# Patient Record
Sex: MALE | Race: WHITE | URBAN - NONMETROPOLITAN AREA
[De-identification: names, ages, dates, MRNs, and addresses within clinical notes are randomized per-mention and may not be internally consistent; named-entity substitution may affect disease eponyms.]

---

## 2017-03-08 ENCOUNTER — DOCTOR'S OFFICE (OUTPATIENT)
Dept: URBAN - NONMETROPOLITAN AREA CLINIC 1 | Facility: CLINIC | Age: 27
Setting detail: OPHTHALMOLOGY
End: 2017-03-08
Payer: COMMERCIAL

## 2017-03-08 DIAGNOSIS — H01.002: ICD-10-CM

## 2017-03-08 DIAGNOSIS — H01.005: ICD-10-CM

## 2017-03-08 DIAGNOSIS — H00.11: ICD-10-CM

## 2017-03-08 DIAGNOSIS — H01.004: ICD-10-CM

## 2017-03-08 DIAGNOSIS — H01.001: ICD-10-CM

## 2017-03-08 DIAGNOSIS — H00.14: ICD-10-CM

## 2017-03-08 PROCEDURE — 92012 INTRM OPH EXAM EST PATIENT: CPT | Performed by: OPHTHALMOLOGY

## 2017-03-08 ASSESSMENT — REFRACTION_CURRENTRX
OD_OVR_VA: 20/
OS_OVR_VA: 20/
OD_OVR_VA: 20/
OS_OVR_VA: 20/
OD_OVR_VA: 20/
OS_OVR_VA: 20/

## 2017-03-08 ASSESSMENT — VISUAL ACUITY
OS_BCVA: 20/80
OD_BCVA: 20/40

## 2017-03-08 ASSESSMENT — REFRACTION_MANIFEST
OD_VA1: 20/
OD_VA3: 20/
OS_VA1: 20/
OS_VA1: 20/
OS_VA2: 20/
OD_VA2: 20/
OD_VA3: 20/
OS_VA1: 20/
OU_VA: 20/
OD_VA1: 20/
OU_VA: 20/
OD_VA2: 20/
OS_VA3: 20/
OU_VA: 20/
OS_VA3: 20/
OS_VA3: 20/
OS_VA2: 20/
OS_VA2: 20/
OD_VA3: 20/
OD_VA2: 20/
OD_VA1: 20/

## 2017-03-08 ASSESSMENT — LID EXAM ASSESSMENTS
OS_BLEPHARITIS: 3+
OD_BLEPHARITIS: 3+

## 2017-03-27 ENCOUNTER — DOCTOR'S OFFICE (OUTPATIENT)
Dept: URBAN - NONMETROPOLITAN AREA CLINIC 1 | Facility: CLINIC | Age: 27
Setting detail: OPHTHALMOLOGY
End: 2017-03-27
Payer: COMMERCIAL

## 2017-03-27 ENCOUNTER — RX ONLY (RX ONLY)
Age: 27
End: 2017-03-27

## 2017-03-27 DIAGNOSIS — H00.11: ICD-10-CM

## 2017-03-27 DIAGNOSIS — H01.004: ICD-10-CM

## 2017-03-27 DIAGNOSIS — H00.14: ICD-10-CM

## 2017-03-27 DIAGNOSIS — H01.005: ICD-10-CM

## 2017-03-27 DIAGNOSIS — H01.002: ICD-10-CM

## 2017-03-27 DIAGNOSIS — H01.001: ICD-10-CM

## 2017-03-27 PROCEDURE — 92012 INTRM OPH EXAM EST PATIENT: CPT | Performed by: OPHTHALMOLOGY

## 2017-03-27 ASSESSMENT — REFRACTION_MANIFEST
OD_VA2: 20/
OS_VA3: 20/
OS_VA2: 20/
OD_VA2: 20/
OD_VA2: 20/
OS_VA2: 20/
OU_VA: 20/
OD_VA1: 20/
OS_VA1: 20/
OU_VA: 20/
OD_VA3: 20/
OD_VA3: 20/
OS_VA1: 20/
OD_VA1: 20/
OS_VA3: 20/
OD_VA3: 20/
OS_VA3: 20/
OS_VA1: 20/
OU_VA: 20/
OS_VA2: 20/
OD_VA1: 20/

## 2017-03-27 ASSESSMENT — REFRACTION_CURRENTRX
OD_OVR_VA: 20/
OS_OVR_VA: 20/
OD_OVR_VA: 20/
OD_OVR_VA: 20/
OS_OVR_VA: 20/
OS_OVR_VA: 20/

## 2017-03-27 ASSESSMENT — LID EXAM ASSESSMENTS
OS_BLEPHARITIS: 3+
OD_BLEPHARITIS: 3+

## 2017-03-27 ASSESSMENT — VISUAL ACUITY
OS_BCVA: 20/25
OD_BCVA: 20/25

## 2017-04-03 ENCOUNTER — DOCTOR'S OFFICE (OUTPATIENT)
Dept: URBAN - NONMETROPOLITAN AREA CLINIC 1 | Facility: CLINIC | Age: 27
Setting detail: OPHTHALMOLOGY
End: 2017-04-03
Payer: COMMERCIAL

## 2017-04-03 DIAGNOSIS — H00.14: ICD-10-CM

## 2017-04-03 DIAGNOSIS — H01.004: ICD-10-CM

## 2017-04-03 DIAGNOSIS — H01.001: ICD-10-CM

## 2017-04-03 DIAGNOSIS — H01.002: ICD-10-CM

## 2017-04-03 DIAGNOSIS — H00.11: ICD-10-CM

## 2017-04-03 DIAGNOSIS — H01.005: ICD-10-CM

## 2017-04-03 PROCEDURE — 92012 INTRM OPH EXAM EST PATIENT: CPT | Performed by: OPHTHALMOLOGY

## 2017-04-03 ASSESSMENT — LID EXAM ASSESSMENTS
OD_BLEPHARITIS: 1+
OS_BLEPHARITIS: 1+
OS_MEIBOMITIS: 1+
OD_MEIBOMITIS: 1+

## 2017-04-25 ASSESSMENT — REFRACTION_MANIFEST
OS_VA2: 20/
OS_VA1: 20/
OS_VA1: 20/
OS_VA2: 20/
OS_VA1: 20/
OD_VA2: 20/
OD_VA3: 20/
OD_VA2: 20/
OS_VA3: 20/
OU_VA: 20/
OS_VA2: 20/
OD_VA1: 20/
OU_VA: 20/
OD_VA1: 20/
OD_VA3: 20/
OD_VA1: 20/
OS_VA3: 20/
OD_VA2: 20/
OS_VA3: 20/
OD_VA3: 20/
OU_VA: 20/

## 2017-04-25 ASSESSMENT — REFRACTION_CURRENTRX
OD_OVR_VA: 20/
OS_OVR_VA: 20/
OD_OVR_VA: 20/
OS_OVR_VA: 20/
OD_OVR_VA: 20/
OS_OVR_VA: 20/

## 2017-04-25 ASSESSMENT — VISUAL ACUITY
OD_BCVA: 2040
OS_BCVA: 20/50

## 2017-07-14 ENCOUNTER — DOCTOR'S OFFICE (OUTPATIENT)
Dept: URBAN - NONMETROPOLITAN AREA CLINIC 1 | Facility: CLINIC | Age: 27
Setting detail: OPHTHALMOLOGY
End: 2017-07-14
Payer: COMMERCIAL

## 2017-07-14 DIAGNOSIS — H01.002: ICD-10-CM

## 2017-07-14 DIAGNOSIS — H00.14: ICD-10-CM

## 2017-07-14 DIAGNOSIS — H10.12: ICD-10-CM

## 2017-07-14 DIAGNOSIS — H10.11: ICD-10-CM

## 2017-07-14 DIAGNOSIS — H01.004: ICD-10-CM

## 2017-07-14 DIAGNOSIS — H00.11: ICD-10-CM

## 2017-07-14 DIAGNOSIS — H01.005: ICD-10-CM

## 2017-07-14 DIAGNOSIS — H01.001: ICD-10-CM

## 2017-07-14 PROCEDURE — 92012 INTRM OPH EXAM EST PATIENT: CPT | Performed by: OPHTHALMOLOGY

## 2017-07-14 PROCEDURE — 92285 EXTERNAL OCULAR PHOTOGRAPHY: CPT | Performed by: OPHTHALMOLOGY

## 2017-07-14 ASSESSMENT — REFRACTION_MANIFEST
OD_VA3: 20/
OS_VA1: 20/
OD_VA3: 20/
OS_VA3: 20/
OD_VA1: 20/
OU_VA: 20/
OD_VA3: 20/
OD_VA1: 20/
OS_VA3: 20/
OS_VA2: 20/
OS_VA1: 20/
OS_VA1: 20/
OD_VA2: 20/
OS_VA3: 20/
OD_VA1: 20/
OU_VA: 20/
OU_VA: 20/
OD_VA2: 20/
OD_VA2: 20/

## 2017-07-14 ASSESSMENT — REFRACTION_CURRENTRX
OD_AXIS: 180
OS_OVR_VA: 20/
OS_SPHERE: -0.75
OS_OVR_VA: 20/
OS_AXIS: 15
OD_OVR_VA: 20/
OD_CYLINDER: 0.00
OD_SPHERE: -1.25
OD_OVR_VA: 20/
OS_OVR_VA: 20/
OD_OVR_VA: 20/
OS_CYLINDER: -0.50

## 2017-07-14 ASSESSMENT — LID EXAM ASSESSMENTS
OD_MEIBOMITIS: 1+
OS_MEIBOMITIS: 1+
OS_BLEPHARITIS: 1+
OD_BLEPHARITIS: 1+

## 2017-07-14 ASSESSMENT — VISUAL ACUITY
OD_BCVA: 20/25+2
OS_BCVA: 20/25+2

## 2017-07-14 ASSESSMENT — SUPERFICIAL PUNCTATE KERATITIS (SPK)
OD_SPK: ABSENT
OS_SPK: ABSENT

## 2017-08-08 ENCOUNTER — AMBUL SURGICAL CARE (OUTPATIENT)
Dept: URBAN - NONMETROPOLITAN AREA SURGERY 1 | Facility: SURGERY | Age: 27
Setting detail: OPHTHALMOLOGY
End: 2017-08-08
Payer: COMMERCIAL

## 2017-08-08 DIAGNOSIS — D23.11: ICD-10-CM

## 2017-08-08 DIAGNOSIS — D23.12: ICD-10-CM

## 2017-08-08 DIAGNOSIS — D23.10: ICD-10-CM

## 2017-08-08 PROCEDURE — 67840 REMOVE EYELID LESION: CPT | Performed by: OPHTHALMOLOGY

## 2017-08-08 PROCEDURE — 67700 BLEPHAROTOMY DRG ABSC EYELID: CPT | Performed by: OPHTHALMOLOGY

## 2017-08-08 PROCEDURE — G8918 PT W/O PREOP ORDER IV AB PRO: HCPCS | Performed by: OPHTHALMOLOGY

## 2017-08-08 PROCEDURE — G8907 PT DOC NO EVENTS ON DISCHARG: HCPCS | Performed by: OPHTHALMOLOGY

## 2017-08-14 ENCOUNTER — RX ONLY (RX ONLY)
Age: 27
End: 2017-08-14

## 2017-08-14 ENCOUNTER — DOCTOR'S OFFICE (OUTPATIENT)
Dept: URBAN - NONMETROPOLITAN AREA CLINIC 1 | Facility: CLINIC | Age: 27
Setting detail: OPHTHALMOLOGY
End: 2017-08-14
Payer: COMMERCIAL

## 2017-08-14 DIAGNOSIS — H01.005: ICD-10-CM

## 2017-08-14 DIAGNOSIS — H01.002: ICD-10-CM

## 2017-08-14 DIAGNOSIS — H01.004: ICD-10-CM

## 2017-08-14 DIAGNOSIS — H01.001: ICD-10-CM

## 2017-08-14 DIAGNOSIS — H10.12: ICD-10-CM

## 2017-08-14 DIAGNOSIS — H10.11: ICD-10-CM

## 2017-08-14 DIAGNOSIS — H00.14: ICD-10-CM

## 2017-08-14 DIAGNOSIS — H00.11: ICD-10-CM

## 2017-08-14 PROCEDURE — 99024 POSTOP FOLLOW-UP VISIT: CPT | Performed by: OPHTHALMOLOGY

## 2017-08-14 ASSESSMENT — REFRACTION_MANIFEST
OD_VA1: 20/
OS_VA3: 20/
OD_VA3: 20/
OS_VA3: 20/
OS_VA2: 20/
OD_VA1: 20/
OS_VA1: 20/
OU_VA: 20/
OS_VA1: 20/
OD_VA1: 20/
OS_VA1: 20/
OU_VA: 20/
OD_VA2: 20/
OD_VA3: 20/
OD_VA3: 20/
OD_VA2: 20/
OD_VA2: 20/
OU_VA: 20/
OS_VA2: 20/
OS_VA3: 20/
OS_VA2: 20/

## 2017-08-14 ASSESSMENT — REFRACTION_CURRENTRX
OD_OVR_VA: 20/
OD_CYLINDER: 0.00
OS_AXIS: 15
OD_SPHERE: -1.25
OD_OVR_VA: 20/
OD_AXIS: 180
OS_OVR_VA: 20/
OS_OVR_VA: 20/
OD_OVR_VA: 20/
OS_OVR_VA: 20/
OS_SPHERE: -0.75
OS_CYLINDER: -0.50

## 2017-08-14 ASSESSMENT — VISUAL ACUITY
OS_BCVA: 20/60-2
OD_BCVA: 20/30-1

## 2017-08-14 ASSESSMENT — LID EXAM ASSESSMENTS
OS_EDEMA: 1+
OS_BLEPHARITIS: 1+
OD_MEIBOMITIS: 1+
OD_EDEMA: 1+
OD_BLEPHARITIS: 1+
OS_MEIBOMITIS: 1+

## 2017-08-14 ASSESSMENT — SUPERFICIAL PUNCTATE KERATITIS (SPK)
OD_SPK: ABSENT
OS_SPK: ABSENT

## 2017-08-21 ENCOUNTER — DOCTOR'S OFFICE (OUTPATIENT)
Dept: URBAN - NONMETROPOLITAN AREA CLINIC 1 | Facility: CLINIC | Age: 27
Setting detail: OPHTHALMOLOGY
End: 2017-08-21
Payer: COMMERCIAL

## 2017-08-21 DIAGNOSIS — H01.005: ICD-10-CM

## 2017-08-21 DIAGNOSIS — H01.004: ICD-10-CM

## 2017-08-21 DIAGNOSIS — H01.002: ICD-10-CM

## 2017-08-21 DIAGNOSIS — H00.11: ICD-10-CM

## 2017-08-21 DIAGNOSIS — H01.001: ICD-10-CM

## 2017-08-21 DIAGNOSIS — H00.14: ICD-10-CM

## 2017-08-21 PROCEDURE — 92012 INTRM OPH EXAM EST PATIENT: CPT | Performed by: OPHTHALMOLOGY

## 2017-08-21 ASSESSMENT — REFRACTION_MANIFEST
OS_VA2: 20/
OD_VA2: 20/
OS_VA1: 20/
OU_VA: 20/
OS_VA1: 20/
OU_VA: 20/
OS_VA2: 20/
OD_VA3: 20/
OD_VA1: 20/
OS_VA1: 20/
OD_VA2: 20/
OD_VA3: 20/
OS_VA3: 20/
OD_VA3: 20/
OD_VA1: 20/
OS_VA2: 20/
OU_VA: 20/
OD_VA2: 20/
OS_VA3: 20/
OD_VA1: 20/
OS_VA3: 20/

## 2017-08-21 ASSESSMENT — REFRACTION_CURRENTRX
OD_AXIS: 180
OD_CYLINDER: 0.00
OD_SPHERE: -1.25
OS_OVR_VA: 20/
OD_OVR_VA: 20/
OS_AXIS: 15
OS_CYLINDER: -0.50
OD_OVR_VA: 20/
OD_OVR_VA: 20/
OS_OVR_VA: 20/
OS_SPHERE: -0.75
OS_OVR_VA: 20/

## 2017-08-21 ASSESSMENT — VISUAL ACUITY
OD_BCVA: 20/20
OS_BCVA: 20/25

## 2017-08-21 ASSESSMENT — CONFRONTATIONAL VISUAL FIELD TEST (CVF)
OS_FINDINGS: FULL
OD_FINDINGS: FULL

## 2017-08-21 ASSESSMENT — LID EXAM ASSESSMENTS
OD_TRICHIASIS: ABSENT
OD_MEIBOMITIS: 1+
OS_MEIBOMITIS: 1+
OS_BLEPHARITIS: 1+
OD_BLEPHARITIS: 1+

## 2017-08-21 ASSESSMENT — SUPERFICIAL PUNCTATE KERATITIS (SPK)
OS_SPK: ABSENT
OD_SPK: ABSENT

## 2017-09-11 ENCOUNTER — DOCTOR'S OFFICE (OUTPATIENT)
Dept: URBAN - NONMETROPOLITAN AREA CLINIC 1 | Facility: CLINIC | Age: 27
Setting detail: OPHTHALMOLOGY
End: 2017-09-11
Payer: COMMERCIAL

## 2017-09-11 DIAGNOSIS — H01.005: ICD-10-CM

## 2017-09-11 DIAGNOSIS — H00.11: ICD-10-CM

## 2017-09-11 DIAGNOSIS — H01.004: ICD-10-CM

## 2017-09-11 DIAGNOSIS — H01.001: ICD-10-CM

## 2017-09-11 DIAGNOSIS — H00.14: ICD-10-CM

## 2017-09-11 DIAGNOSIS — H01.002: ICD-10-CM

## 2017-09-11 PROBLEM — H10.12 ALLERGIC CONJUNCTIVITS; RIGHT EYE, LEFT EYE: Status: ACTIVE | Noted: 2017-07-14

## 2017-09-11 PROBLEM — H10.11 ALLERGIC CONJUNCTIVITS; RIGHT EYE, LEFT EYE: Status: ACTIVE | Noted: 2017-07-14

## 2017-09-11 PROCEDURE — 99214 OFFICE O/P EST MOD 30 MIN: CPT | Performed by: OPHTHALMOLOGY

## 2017-09-11 ASSESSMENT — REFRACTION_MANIFEST
OD_VA1: 20/
OD_VA3: 20/
OD_VA2: 20/
OU_VA: 20/
OD_VA2: 20/
OS_VA3: 20/
OD_VA2: 20/
OD_VA1: 20/
OS_VA1: 20/
OD_VA1: 20/
OS_VA1: 20/
OS_VA1: 20/
OS_VA3: 20/
OD_VA3: 20/
OD_VA3: 20/
OS_VA2: 20/
OS_VA3: 20/
OS_VA2: 20/
OS_VA2: 20/

## 2017-09-11 ASSESSMENT — REFRACTION_CURRENTRX
OS_OVR_VA: 20/
OS_OVR_VA: 20/
OD_SPHERE: -1.25
OD_CYLINDER: 0.00
OD_OVR_VA: 20/
OS_SPHERE: -0.75
OD_OVR_VA: 20/
OS_CYLINDER: -0.50
OD_OVR_VA: 20/
OD_AXIS: 180
OS_AXIS: 15
OS_OVR_VA: 20/

## 2017-09-11 ASSESSMENT — SUPERFICIAL PUNCTATE KERATITIS (SPK)
OS_SPK: ABSENT
OD_SPK: ABSENT

## 2017-09-11 ASSESSMENT — LID EXAM ASSESSMENTS
OD_BLEPHARITIS: 1+
OD_MEIBOMITIS: 1+
OS_BLEPHARITIS: 1+
OS_MEIBOMITIS: 1+
OD_TRICHIASIS: ABSENT

## 2017-09-11 ASSESSMENT — CONFRONTATIONAL VISUAL FIELD TEST (CVF)
OS_FINDINGS: FULL
OD_FINDINGS: FULL

## 2017-09-11 ASSESSMENT — VISUAL ACUITY
OS_BCVA: 20/25-2
OD_BCVA: 20/25-1

## 2020-06-17 ENCOUNTER — APPOINTMENT (OUTPATIENT)
Dept: LAB | Facility: HOSPITAL | Age: 30
End: 2020-06-17
Attending: INTERNAL MEDICINE
Payer: COMMERCIAL

## 2020-06-17 ENCOUNTER — TRANSCRIBE ORDERS (OUTPATIENT)
Dept: ADMINISTRATIVE | Facility: HOSPITAL | Age: 30
End: 2020-06-17

## 2020-06-17 DIAGNOSIS — R74.8 ABNORMAL LIVER ENZYMES: ICD-10-CM

## 2020-06-17 DIAGNOSIS — K76.0 FATTY LIVER: Primary | ICD-10-CM

## 2020-06-17 DIAGNOSIS — K76.0 FATTY LIVER: ICD-10-CM

## 2020-06-17 LAB
ALBUMIN SERPL BCP-MCNC: 4.4 G/DL (ref 3.5–5)
ALP SERPL-CCNC: 157 U/L (ref 46–116)
ALT SERPL W P-5'-P-CCNC: 106 U/L (ref 12–78)
ANION GAP SERPL CALCULATED.3IONS-SCNC: 11 MMOL/L (ref 4–13)
AST SERPL W P-5'-P-CCNC: 41 U/L (ref 5–45)
BASOPHILS # BLD AUTO: 0.04 THOUSANDS/ΜL (ref 0–0.1)
BASOPHILS NFR BLD AUTO: 1 % (ref 0–1)
BILIRUB DIRECT SERPL-MCNC: 0.18 MG/DL (ref 0–0.2)
BILIRUB SERPL-MCNC: 0.7 MG/DL (ref 0.2–1)
BUN SERPL-MCNC: 13 MG/DL (ref 5–25)
CALCIUM SERPL-MCNC: 9.1 MG/DL (ref 8.3–10.1)
CHLORIDE SERPL-SCNC: 103 MMOL/L (ref 100–108)
CO2 SERPL-SCNC: 26 MMOL/L (ref 21–32)
CREAT SERPL-MCNC: 0.84 MG/DL (ref 0.6–1.3)
EOSINOPHIL # BLD AUTO: 0.14 THOUSAND/ΜL (ref 0–0.61)
EOSINOPHIL NFR BLD AUTO: 3 % (ref 0–6)
ERYTHROCYTE [DISTWIDTH] IN BLOOD BY AUTOMATED COUNT: 12.1 % (ref 11.6–15.1)
GFR SERPL CREATININE-BSD FRML MDRD: 118 ML/MIN/1.73SQ M
GLUCOSE SERPL-MCNC: 93 MG/DL (ref 65–140)
HCT VFR BLD AUTO: 42.1 % (ref 36.5–49.3)
HGB BLD-MCNC: 14.9 G/DL (ref 12–17)
IMM GRANULOCYTES # BLD AUTO: 0.02 THOUSAND/UL (ref 0–0.2)
IMM GRANULOCYTES NFR BLD AUTO: 0 % (ref 0–2)
LYMPHOCYTES # BLD AUTO: 1.08 THOUSANDS/ΜL (ref 0.6–4.47)
LYMPHOCYTES NFR BLD AUTO: 19 % (ref 14–44)
MCH RBC QN AUTO: 31.2 PG (ref 26.8–34.3)
MCHC RBC AUTO-ENTMCNC: 35.4 G/DL (ref 31.4–37.4)
MCV RBC AUTO: 88 FL (ref 82–98)
MONOCYTES # BLD AUTO: 0.48 THOUSAND/ΜL (ref 0.17–1.22)
MONOCYTES NFR BLD AUTO: 9 % (ref 4–12)
NEUTROPHILS # BLD AUTO: 3.83 THOUSANDS/ΜL (ref 1.85–7.62)
NEUTS SEG NFR BLD AUTO: 68 % (ref 43–75)
NRBC BLD AUTO-RTO: 0 /100 WBCS
PLATELET # BLD AUTO: 198 THOUSANDS/UL (ref 149–390)
PMV BLD AUTO: 12.6 FL (ref 8.9–12.7)
POTASSIUM SERPL-SCNC: 4.3 MMOL/L (ref 3.5–5.3)
PROT SERPL-MCNC: 7.6 G/DL (ref 6.4–8.2)
RBC # BLD AUTO: 4.77 MILLION/UL (ref 3.88–5.62)
SODIUM SERPL-SCNC: 140 MMOL/L (ref 136–145)
WBC # BLD AUTO: 5.59 THOUSAND/UL (ref 4.31–10.16)

## 2020-06-17 PROCEDURE — 85025 COMPLETE CBC W/AUTO DIFF WBC: CPT

## 2020-06-17 PROCEDURE — 82248 BILIRUBIN DIRECT: CPT

## 2020-06-17 PROCEDURE — 36415 COLL VENOUS BLD VENIPUNCTURE: CPT

## 2020-06-17 PROCEDURE — 80053 COMPREHEN METABOLIC PANEL: CPT

## 2020-06-17 PROCEDURE — 86038 ANTINUCLEAR ANTIBODIES: CPT

## 2020-06-19 LAB — RYE IGE QN: NEGATIVE

## 2024-05-14 ENCOUNTER — OFFICE VISIT (OUTPATIENT)
Dept: URGENT CARE | Facility: CLINIC | Age: 34
End: 2024-05-14
Payer: COMMERCIAL

## 2024-05-14 VITALS
WEIGHT: 170 LBS | RESPIRATION RATE: 18 BRPM | OXYGEN SATURATION: 97 % | DIASTOLIC BLOOD PRESSURE: 68 MMHG | HEIGHT: 65 IN | BODY MASS INDEX: 28.32 KG/M2 | TEMPERATURE: 96.3 F | HEART RATE: 90 BPM | SYSTOLIC BLOOD PRESSURE: 112 MMHG

## 2024-05-14 DIAGNOSIS — M53.3 SI (SACROILIAC) JOINT DYSFUNCTION: Primary | ICD-10-CM

## 2024-05-14 PROCEDURE — S9083 URGENT CARE CENTER GLOBAL: HCPCS

## 2024-05-14 PROCEDURE — G0382 LEV 3 HOSP TYPE B ED VISIT: HCPCS

## 2024-05-14 RX ORDER — ALBUTEROL SULFATE 90 UG/1
2 AEROSOL, METERED RESPIRATORY (INHALATION) 2 TIMES DAILY PRN
COMMUNITY
Start: 2024-02-02

## 2024-05-14 RX ORDER — OMEPRAZOLE/SODIUM BICARBONATE 40; 1680 MG/1; MG/1
40 POWDER, FOR SUSPENSION ORAL DAILY
COMMUNITY

## 2024-05-14 RX ORDER — LEVETIRACETAM 500 MG/1
500 TABLET, FILM COATED ORAL 2 TIMES DAILY
COMMUNITY
Start: 2024-04-29

## 2024-05-14 RX ORDER — METHYLPREDNISOLONE 4 MG/1
TABLET ORAL
Qty: 21 EACH | Refills: 0 | Status: SHIPPED | OUTPATIENT
Start: 2024-05-14

## 2024-05-14 NOTE — PATIENT INSTRUCTIONS
Take oral steroids as prescribed  OTC Tylenol as needed for pain  Heat/Ice  Topical analgesics  Referral placed to PT   Follow up with PCP in 3-5 days.  Proceed to  ER if symptoms worsen.    If tests have been performed at [facility], our office will contact you with results if changes need to be made to the care plan discussed with you at the visit.  You can review your full results on [facility] MyChart.    Back Pain   AMBULATORY CARE:   Back pain  is common. You may have back pain and muscle spasms. You may feel sore or stiff on one or both sides of your back. The pain may spread to your lower body. Conditions that affect the spine, joints, or muscles can cause back pain. These may include arthritis, spinal stenosis (narrowing of the spinal column), muscle tension, or breakdown of the spinal discs.  Call your local emergency number (911 in the [place]) if:   You have severe back pain with chest pain.    You cannot control your urine or bowel movements.    Your pain becomes so severe that you cannot walk.    Seek care immediately if:   You have pain, numbness, or weakness in one or both legs.    You have severe back pain, nausea, and vomiting.    You have severe back pain that spreads to your side or genital area.    Call your doctor if:   You have back pain that does not get better with rest and pain medicine.    You have a fever.    You have pain that worsens when you are on your back or when you rest.    You have pain that worsens when you cough or sneeze.    You lose weight without trying.    You have questions or concerns about your condition or care.    Medicines:  You may need any of the following:  NSAIDs  help decrease swelling and pain or fever. This medicine is available with or without a doctor's order. NSAIDs can cause stomach bleeding or kidney problems in certain people. If you take blood thinner medicine, always ask your healthcare provider if NSAIDs are safe for you. Always read the medicine label and  follow directions.    Acetaminophen  decreases pain and fever. It is available without a doctor's order. Ask how much to take and how often to take it. Follow directions. Read the labels of all other medicines you are using to see if they also contain acetaminophen, or ask your doctor or pharmacist. Acetaminophen can cause liver damage if not taken correctly.    Muscle relaxers  help decrease muscle spasms and back pain.    Take your medicine as directed.  Contact your healthcare provider if you think your medicine is not helping or if you have side effects. Tell your provider if you are allergic to any medicine. Keep a list of the medicines, vitamins, and herbs you take. Include the amounts, and when and why you take them. Bring the list or the pill bottles to follow-up visits. Carry your medicine list with you in case of an emergency.    Manage your back pain:   Apply ice  on your back for 15 to 20 minutes every hour or as directed. Use an ice pack, or put crushed ice in a plastic bag. Cover it with a towel before you apply it to your skin. Ice helps prevent tissue damage and decreases pain.    Apply heat  on your back for 20 to 30 minutes every 2 hours for as many days as directed. Heat helps decrease pain and muscle spasms.    Stay active  as much as you can without causing more pain. Bed rest could make your back pain worse. Avoid heavy lifting until your pain is gone.         Go to physical therapy  as directed. A physical therapist can teach you exercises to help improve movement and strength, and to decrease pain.    Follow up with your doctor in 2 weeks, or as directed:  You might need to see a specialist. Write down your questions so you remember to ask them during your visits.  © Copyright [employer] 2023 Information is for End User's use only and may not be sold, redistributed or otherwise used for commercial purposes.  The above information is an  only. It is not intended as medical advice  for individual conditions or treatments. Talk to your doctor, nurse or pharmacist before following any medical regimen to see if it is safe and effective for you.

## 2024-05-14 NOTE — PROGRESS NOTES
[facility]        NAME: Emigdio Regalado is a 33 y.o. male  : 1990    MRN: [id number]  DATE: May 14, 2024  TIME: 8:42 AM    Assessment and Plan   SI (sacroiliac) joint dysfunction [M53.3]  1. SI (sacroiliac) joint dysfunction  methylPREDNISolone 4 MG tablet therapy pack    Ambulatory Referral to Physical Therapy        Clinical findings concerning for left-sided SI joint dysfunction and will trial oral steroids. Encouraged continued supportive measures.  Referral placed to PT. Follow up with PCP in 3-5 days or proceed to emergency department for worsening symptoms.  Patient and mother verbalized understanding of instructions given.       Patient Instructions     Patient Instructions   Take oral steroids as prescribed  OTC Tylenol as needed for pain  Heat/Ice  Topical analgesics  Referral placed to PT   Follow up with PCP in 3-5 days.  Proceed to  ER if symptoms worsen.    If tests have been performed at [facility], our office will contact you with results if changes need to be made to the care plan discussed with you at the visit.  You can review your full results on [facility] MyChart.    Back Pain   AMBULATORY CARE:   Back pain  is common. You may have back pain and muscle spasms. You may feel sore or stiff on one or both sides of your back. The pain may spread to your lower body. Conditions that affect the spine, joints, or muscles can cause back pain. These may include arthritis, spinal stenosis (narrowing of the spinal column), muscle tension, or breakdown of the spinal discs.  Call your local emergency number (911 in the [place]) if:   You have severe back pain with chest pain.    You cannot control your urine or bowel movements.    Your pain becomes so severe that you cannot walk.    Seek care immediately if:   You have pain, numbness, or weakness in one or both legs.    You have severe back pain, nausea, and vomiting.    You have severe back pain that spreads to your side or genital  area.    Call your doctor if:   You have back pain that does not get better with rest and pain medicine.    You have a fever.    You have pain that worsens when you are on your back or when you rest.    You have pain that worsens when you cough or sneeze.    You lose weight without trying.    You have questions or concerns about your condition or care.    Medicines:  You may need any of the following:  NSAIDs  help decrease swelling and pain or fever. This medicine is available with or without a doctor's order. NSAIDs can cause stomach bleeding or kidney problems in certain people. If you take blood thinner medicine, always ask your healthcare provider if NSAIDs are safe for you. Always read the medicine label and follow directions.    Acetaminophen  decreases pain and fever. It is available without a doctor's order. Ask how much to take and how often to take it. Follow directions. Read the labels of all other medicines you are using to see if they also contain acetaminophen, or ask your doctor or pharmacist. Acetaminophen can cause liver damage if not taken correctly.    Muscle relaxers  help decrease muscle spasms and back pain.    Take your medicine as directed.  Contact your healthcare provider if you think your medicine is not helping or if you have side effects. Tell your provider if you are allergic to any medicine. Keep a list of the medicines, vitamins, and herbs you take. Include the amounts, and when and why you take them. Bring the list or the pill bottles to follow-up visits. Carry your medicine list with you in case of an emergency.    Manage your back pain:   Apply ice  on your back for 15 to 20 minutes every hour or as directed. Use an ice pack, or put crushed ice in a plastic bag. Cover it with a towel before you apply it to your skin. Ice helps prevent tissue damage and decreases pain.    Apply heat  on your back for 20 to 30 minutes every 2 hours for as many days as directed. Heat helps decrease  pain and muscle spasms.    Stay active  as much as you can without causing more pain. Bed rest could make your back pain worse. Avoid heavy lifting until your pain is gone.         Go to physical therapy  as directed. A physical therapist can teach you exercises to help improve movement and strength, and to decrease pain.    Follow up with your doctor in 2 weeks, or as directed:  You might need to see a specialist. Write down your questions so you remember to ask them during your visits.  © Copyright [employer] 2023 Information is for End User's use only and may not be sold, redistributed or otherwise used for commercial purposes.  The above information is an  only. It is not intended as medical advice for individual conditions or treatments. Talk to your doctor, nurse or pharmacist before following any medical regimen to see if it is safe and effective for you.        Chief Complaint     Chief Complaint   Patient presents with    Buttocks Pain     C/o pain over left buttock which becomes worse with weight bearing. Onset approximately 7 days. Gait affected.         History of Present Illness       33-year-old male with a past medical history significant for autism and seizures presents with mother for complaints of left-sided low back pain and buttock pain x 1 week.  Denies specific known injury or trauma although patient does engage in therapeutic course riding and [employer] swimming.  Pain increases with ambulation.  Denies numbness, tingling, weakness, loss of bowel or bladder incontinence, or saddle anesthesia.  Mother has been giving the patient OTC ibuprofen as well as applying heat.         Review of Systems   Review of Systems   Constitutional:  Negative for chills and fever.   Respiratory:  Negative for cough and shortness of breath.    Cardiovascular:  Negative for chest pain.   Gastrointestinal:  Negative for abdominal pain, diarrhea, nausea and vomiting.   Genitourinary:  Negative for  "decreased urine volume and difficulty urinating.   Musculoskeletal:  Positive for back pain.   Skin:  Negative for rash.   Neurological:  Negative for weakness and numbness.         Current Medications       Current Outpatient Medications:     albuterol (PROVENTIL HFA,VENTOLIN HFA) 90 mcg/act inhaler, Inhale 2 puffs 2 (two) times a day as needed, Disp: , Rfl:     diphenhydrAMINE-Phenylephrine (Benadryl Allergy Childrens) 12.5-5 MG/5ML SOLN, Take 10 mL by mouth every 8 (eight) hours as needed Has multiple food allergies, Disp: , Rfl:     Keppra 500 MG tablet, Take 500 mg by mouth 2 (two) times a day, Disp: , Rfl:     methylPREDNISolone 4 MG tablet therapy pack, Use as directed on package, Disp: 21 each, Rfl: 0    Omeprazole-Sodium Bicarbonate  MG PACK, Take 40 mg by mouth daily, Disp: , Rfl:     Current Allergies     Allergies as of 05/14/2024    (No Known Allergies)            The following portions of the patient's history were reviewed and updated as appropriate: allergies, current medications, past family history, past medical history, past social history, past surgical history and problem list.     Past Medical History:   Diagnosis Date    Autism     GERD (gastroesophageal reflux disease)     Pancreatitis     Seizures (HCC)        Past Surgical History:   Procedure Laterality Date    PANCREAS SURGERY         History reviewed. No pertinent family history.      Medications have been verified.        Objective   /68   Pulse 90   Temp (!) 96.3 °F (35.7 °C)   Resp 18   Ht 5' 5\" (1.651 m)   Wt 77.1 kg (170 lb)   SpO2 97%   BMI 28.29 kg/m²   No LMP for male patient.       Physical Exam     Physical Exam  Vitals and nursing note reviewed.   Constitutional:       General: He is not in acute distress.     Appearance: He is not toxic-appearing.   HENT:      Head: Normocephalic.   Eyes:      Conjunctiva/sclera: Conjunctivae normal.   Cardiovascular:      Rate and Rhythm: Normal rate and regular rhythm. "      Heart sounds: Normal heart sounds.   Pulmonary:      Effort: Pulmonary effort is normal. No respiratory distress.      Breath sounds: No stridor. No wheezing, rhonchi or rales.   Abdominal:      General: Bowel sounds are normal.      Palpations: Abdomen is soft.      Tenderness: There is no abdominal tenderness.   Musculoskeletal:      Thoracic back: No tenderness or bony tenderness.      Lumbar back: No tenderness or bony tenderness. Decreased range of motion. Negative right straight leg raise test and negative left straight leg raise test.      Comments: + left sided SI joint tenderness    Skin:     General: Skin is warm and dry.   Neurological:      Mental Status: He is alert and oriented to person, place, and time.      Sensory: Sensation is intact.      Motor: Motor function is intact.      Gait: Gait is intact.   Psychiatric:         Mood and Affect: Mood normal.         Behavior: Behavior normal.

## 2024-06-20 ENCOUNTER — EVALUATION (OUTPATIENT)
Dept: PHYSICAL THERAPY | Facility: CLINIC | Age: 34
End: 2024-06-20
Payer: COMMERCIAL

## 2024-06-20 VITALS — OXYGEN SATURATION: 98 % | HEART RATE: 67 BPM | DIASTOLIC BLOOD PRESSURE: 80 MMHG | SYSTOLIC BLOOD PRESSURE: 128 MMHG

## 2024-06-20 DIAGNOSIS — M53.3 SI (SACROILIAC) JOINT DYSFUNCTION: ICD-10-CM

## 2024-06-20 PROCEDURE — 97530 THERAPEUTIC ACTIVITIES: CPT | Performed by: PHYSICAL THERAPIST

## 2024-06-20 PROCEDURE — 97161 PT EVAL LOW COMPLEX 20 MIN: CPT | Performed by: PHYSICAL THERAPIST

## 2024-06-20 NOTE — LETTER
2024    Jonny Bui MD  [address] [facility] [address]    Patient: Emigdio Regalado   YOB: 1990   Date of Visit: 2024     Encounter Diagnosis     ICD-10-CM    1. SI (sacroiliac) joint dysfunction  M53.3 Ambulatory Referral to Physical Therapy          Dear Dr. Bui:    Thank you for your recent referral of Emigdio Regalado. Please review the attached evaluation summary from Emigdio's recent visit.     Please verify that you agree with the plan of care by signing the attached order.     If you have any questions or concerns, please do not hesitate to call.     I sincerely appreciate the opportunity to share in the care of one of your patients and hope to have another opportunity to work with you in the near future.       Sincerely,    Hui Oden, PT      Referring Provider:      I certify that I have read the below Plan of Care and certify the need for these services furnished under this plan of treatment while under my care.                    Jonny Bui MD   [address]  [facility] [address]  Via Fax: [phone]          PT Evaluation     Today's date: 2024  Patient name: Emigdio Regalado  : 1990  MRN: [id number]  Referring provider: Clary Chaparro CRNP  Dx:   Encounter Diagnosis     ICD-10-CM    1. SI (sacroiliac) joint dysfunction  M53.3 Ambulatory Referral to Physical Therapy                     Assessment  Irritability comments: no irritability    Assessment details: 34 yo with hx of LBP early May 2024 tx with medication at Urgent care. Presents today with his Mother for assessment and recommendations. Pt had no sx provocation with assessment as documented and notes no back pain for the past  month. Pt today  with educated in lift and carry form and body mechanics, bed mobility and provided with handouts. Information reviewed with Pt's Mother as well. No further intervention with PT needed at this time secondary to  pt status  "with ROM, flexibility, pain resolved, no functional limitations or interference with ADLs. Goal achieved of pt I with good return of proper body mechanics with lift and carry for saddle, case of water, and for bed mobility to reduce likelihood of recurrence.   Understanding of Dx/Px/POC: good    Goals  Pt  educated proper body mechanics and lift carry technique to reduce likelihood of recurrence, reinjury with good return - Achieved today.     Plan    Planned therapy interventions: body mechanics training    Frequency: 1 visit.  Duration in weeks: 1  Plan of Care beginning date: 2024  Plan of Care expiration date: 2024  Treatment plan discussed with: patient and family        Subjective Evaluation    History of Present Illness  Mechanism of injury: Pt  and his Mother provide hx of onset of L LBP  early May, prompting visit to urgent care. He was provided with medication and notes pain has resolved. Pt and Mother report he has returned to therapeutic horsebck riding and swimming, helping carry groceries, bed mobility and ADLs with out pain. Pt 's Mother reports a hx of similar in the past years and did have PT in the past. Pt and his Mother both state back pain is not present or limiting his ability in any capacity at this time. Pt and Mother were agreeable to proceed with evaluation and recommendations for tx. Pt denies any numbness, tingling , weakness , difficulty or changes with bowel or baldder control, saddle anesthesia, night pain or difficulty sleeping/pain awakenings.Denies pain with cough, sneeze or laugh.   Quality of life: good    Patient Goals  Patient goal: \"\" pt - nothing, feels good\"  Pain  Current pain ratin  At best pain ratin  At worst pain ratin    Social Support  Steps to enter house: yes  2  Stairs in house: yes   Lives with: parents    Employment status: working ([employer] sitting)  Exercise history: no    Treatments  Current treatment: medication        Objective "     Concurrent Complaints  Negative for night pain, disturbed sleep, bladder dysfunction, bowel dysfunction and saddle (S4) numbness    Static Posture     Comments  Mild flattened lumbar lordosis   Level pelvis  Inc B pronation of feet    Palpation   Left   No palpable tenderness to the erector spinae, quadratus lumborum and rectus abdominus.     Right   No palpable tenderness to the erector spinae, quadratus lumborum and rectus abdominus.     Additional Palpation Details  No  pain or tenderness of either piriformis or sciatic notch    Tenderness     Lumbar Spine  No tenderness in the spinous process and facet joint.     Left Hip   No tenderness in the ASIS and PSIS.     Right Hip   No tenderness in the ASIS and PSIS.     Neurological Testing     Sensation     Lumbar   Left   Intact: light touch    Right   Intact: light touch    Active Range of Motion   Cervical/Thoracic Spine       Thoracic    Flexion:  WFL  Extension:  WFL  Left lateral flexion:  WFL  Right lateral flexion:  WFL  Left rotation:  WFL  Right rotation:  WFL    Lumbar   Flexion:  Restriction level: minimal  Extension:  Restriction level: minimal  Left lateral flexion:  WFL  Right lateral flexion:  WFL  Left rotation:  WFL  Right rotation:  WFL    Additional Active Range of Motion Details  Demonstrates reversal of lumbar curve with flexion, no complaints of pain with repeated motions of lumbar spine or sustained endrange of lumbar spine  \  Pt able to squat to floor with U UE finger tip touch on table    Joint Play   Joints within functional limits: L1, L2, L3 and L4     Hypomobile: L5 and S1     Strength/Myotome Testing     Lumbar   Left   Heel walk: normal  Toe walk: normal    Right   Heel walk: normal  Toe walk: normal    Left Hip   Planes of Motion   Flexion: 5  Extension: 5  Abduction: 5  Adduction: 5    Right Hip   Planes of Motion   Flexion: 5  Extension: 5  Abduction: 5  Adduction: 5    Left Knee   Flexion: 5  Extension: 5    Right Knee    Flexion: 5  Extension: 5    Left Ankle/Foot   Dorsiflexion: 5  Plantar flexion: 5  Great toe flexion: 5  Great toe extension: 5    Right Ankle/Foot   Dorsiflexion: 5  Plantar flexion: 5  Great toe flexion: 5  Great toe extension: 5    Additional Strength Details  Able to isolate TA, RA without diffiiculty    Tests     Lumbar   Negative SIJ compression, sacroiliac distraction and sacral spring .     Left   Negative passive SLR and quadrant.     Right   Negative passive SLR and quadrant.     Left Hip   Negative BUDDY, FADIR and long sit.     Right Hip   Negative BUDDY, FADIR and long sit.     Ambulation     Observational Gait   Gait: within functional limits     General Comments:    Lower quarter screen   Hips: unremarkable  Knees: unremarkable    Ankle/Foot Comments   Inc B foot pronation           Precautions: seizure disorder, autism      Daily Treatment Diary:      Initial Evaluation Date: 06/20/24  Compliance 6/20                     Visit Number 1                    Re-Eval  IE                 MC   Foto Captured                            6/20                     Manual                                                                                        Ther-Ex                                                                                                                                                                                                                                                  Neuro Re-Ed                                                                                                Ther-Act              pt education body mechanics, bed mobility and transfers, lift carry simulation and handouts educ pt and family PC 15m                                                Modalities                                                                           Access Code: [email or web address]  URL: [email or web address]  Date: 06/20/2024  Prepared by: Hui Oden    Patient Education  - Lifting  Techniques  - Log Roll

## 2024-06-20 NOTE — PROGRESS NOTES
PT Evaluation     Today's date: 2024  Patient name: Emigdio Regalado  : 1990  MRN: [id number]  Referring provider: Clary Chaparro CRNP  Dx:   Encounter Diagnosis     ICD-10-CM    1. SI (sacroiliac) joint dysfunction  M53.3 Ambulatory Referral to Physical Therapy                     Assessment  Irritability comments: no irritability    Assessment details: 32 yo with hx of LBP early May 2024 tx with medication at Urgent care. Presents today with his Mother for assessment and recommendations. Pt had no sx provocation with assessment as documented and notes no back pain for the past  month. Pt today  with educated in lift and carry form and body mechanics, bed mobility and provided with handouts. Information reviewed with Pt's Mother as well. No further intervention with PT needed at this time secondary to  pt status with ROM, flexibility, pain resolved, no functional limitations or interference with ADLs. Goal achieved of pt I with good return of proper body mechanics with lift and carry for saddle, case of water, and for bed mobility to reduce likelihood of recurrence.   Understanding of Dx/Px/POC: good    Goals  Pt  educated proper body mechanics and lift carry technique to reduce likelihood of recurrence, reinjury with good return - Achieved today.     Plan    Planned therapy interventions: body mechanics training    Frequency: 1 visit.  Duration in weeks: 1  Plan of Care beginning date: 2024  Plan of Care expiration date: 2024  Treatment plan discussed with: patient and family        Subjective Evaluation    History of Present Illness  Mechanism of injury: Pt  and his Mother provide hx of onset of L LBP  early May, prompting visit to urgent care. He was provided with medication and notes pain has resolved. Pt and Mother report he has returned to therapeutic horsebck riding and swimming, helping carry groceries, bed mobility and ADLs with out pain. Pt 's Mother reports a hx of similar in the  "past years and did have PT in the past. Pt and his Mother both state back pain is not present or limiting his ability in any capacity at this time. Pt and Mother were agreeable to proceed with evaluation and recommendations for tx. Pt denies any numbness, tingling , weakness , difficulty or changes with bowel or baldder control, saddle anesthesia, night pain or difficulty sleeping/pain awakenings.Denies pain with cough, sneeze or laugh.   Quality of life: good    Patient Goals  Patient goal: \"\" pt - nothing, feels good\"  Pain  Current pain ratin  At best pain ratin  At worst pain ratin    Social Support  Steps to enter house: yes  2  Stairs in house: yes   Lives with: parents    Employment status: working ([employer] sitting)  Exercise history: no    Treatments  Current treatment: medication        Objective     Concurrent Complaints  Negative for night pain, disturbed sleep, bladder dysfunction, bowel dysfunction and saddle (S4) numbness    Static Posture     Comments  Mild flattened lumbar lordosis   Level pelvis  Inc B pronation of feet    Palpation   Left   No palpable tenderness to the erector spinae, quadratus lumborum and rectus abdominus.     Right   No palpable tenderness to the erector spinae, quadratus lumborum and rectus abdominus.     Additional Palpation Details  No  pain or tenderness of either piriformis or sciatic notch    Tenderness     Lumbar Spine  No tenderness in the spinous process and facet joint.     Left Hip   No tenderness in the ASIS and PSIS.     Right Hip   No tenderness in the ASIS and PSIS.     Neurological Testing     Sensation     Lumbar   Left   Intact: light touch    Right   Intact: light touch    Active Range of Motion   Cervical/Thoracic Spine       Thoracic    Flexion:  WFL  Extension:  WFL  Left lateral flexion:  WFL  Right lateral flexion:  WFL  Left rotation:  WFL  Right rotation:  WFL    Lumbar   Flexion:  Restriction level: minimal  Extension:  Restriction " level: minimal  Left lateral flexion:  WFL  Right lateral flexion:  WFL  Left rotation:  WFL  Right rotation:  WFL    Additional Active Range of Motion Details  Demonstrates reversal of lumbar curve with flexion, no complaints of pain with repeated motions of lumbar spine or sustained endrange of lumbar spine  \  Pt able to squat to floor with U UE finger tip touch on table    Joint Play   Joints within functional limits: L1, L2, L3 and L4     Hypomobile: L5 and S1     Strength/Myotome Testing     Lumbar   Left   Heel walk: normal  Toe walk: normal    Right   Heel walk: normal  Toe walk: normal    Left Hip   Planes of Motion   Flexion: 5  Extension: 5  Abduction: 5  Adduction: 5    Right Hip   Planes of Motion   Flexion: 5  Extension: 5  Abduction: 5  Adduction: 5    Left Knee   Flexion: 5  Extension: 5    Right Knee   Flexion: 5  Extension: 5    Left Ankle/Foot   Dorsiflexion: 5  Plantar flexion: 5  Great toe flexion: 5  Great toe extension: 5    Right Ankle/Foot   Dorsiflexion: 5  Plantar flexion: 5  Great toe flexion: 5  Great toe extension: 5    Additional Strength Details  Able to isolate TA, RA without diffiiculty    Tests     Lumbar   Negative SIJ compression, sacroiliac distraction and sacral spring .     Left   Negative passive SLR and quadrant.     Right   Negative passive SLR and quadrant.     Left Hip   Negative BUDDY, FADIR and long sit.     Right Hip   Negative BUDDY, FADIR and long sit.     Ambulation     Observational Gait   Gait: within functional limits     General Comments:    Lower quarter screen   Hips: unremarkable  Knees: unremarkable    Ankle/Foot Comments   Inc B foot pronation           Precautions: seizure disorder, autism      Daily Treatment Diary:      Initial Evaluation Date: 06/20/24  Compliance 6/20                     Visit Number 1                    Re-Eval  IE                 MC   Foto Captured                            6/20                     Manual                                                                                         Ther-Ex                                                                                                                                                                                                                                                  Neuro Re-Ed                                                                                                Ther-Act              pt education body mechanics, bed mobility and transfers, lift carry simulation and handouts educ pt and family PC 15m                                                Modalities                                                                           Access Code: [email or web address]  URL: [email or web address]  Date: 06/20/2024  Prepared by: Hui Oden    Patient Education  - Lifting Techniques  - Log Roll